# Patient Record
Sex: MALE | Race: OTHER | HISPANIC OR LATINO | URBAN - METROPOLITAN AREA
[De-identification: names, ages, dates, MRNs, and addresses within clinical notes are randomized per-mention and may not be internally consistent; named-entity substitution may affect disease eponyms.]

---

## 2024-04-14 ENCOUNTER — EMERGENCY (EMERGENCY)
Facility: HOSPITAL | Age: 34
LOS: 1 days | Discharge: HOME | End: 2024-04-14
Attending: STUDENT IN AN ORGANIZED HEALTH CARE EDUCATION/TRAINING PROGRAM
Payer: COMMERCIAL

## 2024-04-14 VITALS
HEART RATE: 66 BPM | WEIGHT: 218.26 LBS | DIASTOLIC BLOOD PRESSURE: 85 MMHG | RESPIRATION RATE: 18 BRPM | OXYGEN SATURATION: 98 % | TEMPERATURE: 98 F | SYSTOLIC BLOOD PRESSURE: 164 MMHG

## 2024-04-14 PROCEDURE — 99283 EMERGENCY DEPT VISIT LOW MDM: CPT

## 2024-04-14 RX ORDER — IBUPROFEN 200 MG
1 TABLET ORAL
Qty: 20 | Refills: 0
Start: 2024-04-14 | End: 2024-04-18

## 2024-04-14 RX ORDER — IBUPROFEN 200 MG
600 TABLET ORAL ONCE
Refills: 0 | Status: COMPLETED | OUTPATIENT
Start: 2024-04-14 | End: 2024-04-14

## 2024-04-14 RX ADMIN — Medication 600 MILLIGRAM(S): at 16:03

## 2024-04-14 RX ADMIN — Medication 1 TABLET(S): at 16:03

## 2024-04-14 NOTE — ED PROVIDER NOTE - PATIENT PORTAL LINK FT
You can access the FollowMyHealth Patient Portal offered by [facility] by registering at the following website: [email or web address]. By joining [facility]’s FollowMyHealth portal, you will also be able to view your health information using other applications (apps) compatible with our system.

## 2024-04-14 NOTE — ED PROVIDER NOTE - NSFOLLOWUPCLINICS_GEN_ALL_ED_FT
[facility]  Dental  [address] [place] [id number]  Phone: [phone]  Fax:

## 2024-04-14 NOTE — ED ADULT TRIAGE NOTE - CHIEF COMPLAINT QUOTE
C/o swelling and right sided toothache x 8 days. Denies recent dental work. Patient states "My molar on the right side has been bothering me for a while". Denies discharge.

## 2024-04-14 NOTE — ED PROVIDER NOTE - CLINICAL SUMMARY MEDICAL DECISION MAKING FREE TEXT BOX
34-year-old male, presents for evaluation of right lower jaw dental pain for 1 week.  patient appears comfortable, no signs of distress.  Afebrile in triage.  On exam no gum swelling, tenderness or fluctuance.  No cheek fluctuance or tenderness to palpation.  Denies any systemic symptoms.  At this time lower suspicion for dental abscess.  Will treat with Augmentin and ibuprofen and referred for dental evaluation within 2-3 days.  Will provide list of clinics in [place] and [facility] dental clinic.  Discussed red flags to come back to the hospital.

## 2024-04-14 NOTE — ED PROVIDER NOTE - OBJECTIVE STATEMENT
34-year-old male, no significant past medical history, presents for evaluation of right lower jaw dental pain for 1 week.  Reports that has had issues with the same tooth in the past.  7 days ago gradual onset of continuous pain.  In the last 4 days noticed some swelling to the right side of the face as well.  Denies any fever, chills, difficulty swallowing, painful swallowing, night sweats or any other complaints.  Did not follow-up with a dentist or take any medication.

## 2024-04-14 NOTE — ED PROVIDER NOTE - PHYSICAL EXAMINATION
No trismus.  Tooth #31 without any significant decay and with tenderness to percussion.  Tooth #32 with brownish discoloration.  No gum swelling, tenderness or fluctuance.  No cheek fluctuance or tenderness to palpation.  No cervical lymphadenopathy.    Minimal right lower cheek swelling.

## 2024-04-14 NOTE — ED ADULT NURSE NOTE - NSFALLUNIVINTERV_ED_ALL_ED
Bed/Stretcher in lowest position, wheels locked, appropriate side rails in place/Call bell, personal items and telephone in reach/Instruct patient to call for assistance before getting out of bed/chair/stretcher/Non-slip footwear applied when patient is off stretcher/[place] to call system/Physically safe environment - no spills, clutter or unnecessary equipment/Purposeful proactive rounding/Room/bathroom lighting operational, light cord in reach

## 2024-04-14 NOTE — ED PROVIDER NOTE - ATTENDING APP SHARED VISIT CONTRIBUTION OF CARE
I, Byron Becker DO reviewed the [facility] plan of care and discussed the case with the ACP.  I was available for any questions and concerns

## 2024-04-14 NOTE — ED PROVIDER NOTE - NSFOLLOWUPINSTRUCTIONS_ED_ALL_ED_FT
Called and spoke to patient. Experecing dyspnea on exertion with simple activity as well as at night at rest.   Reviewed recent echocardiogram with patient.   Lasix 20 mg sent for 5 days to see if helps symptoms. He will call back following if no improvement and at that time may need to be evaluated in office. He verbally understood plan.    Seguimiento con la clínica dental dentro de 2-3 días.  Si experimenta un dolor que empeora, alfred hinchazón que empeora, fiebre, dificultad para abrir la boca o tragar o cualquier otro síntoma que le preocupe, debe regresar al hospital para ser reevaluado.  [place] el antibiótico según lo prescrito hasta la última dosis.      Si experimenta algún síntoma nuevo o que empeora, o si está preocupado, siempre puede regresar a emergencias para alfred reevaluación.    Si le dieron alguna receta ale la visita de lea cortez según lo prescrito. Si tienes alguna michael puedes preguntar al farmacéutico.    * * *    Follow-up with the dental clinic within 2-3 days.  If you experience worsening pain, worsening swelling, fever, difficulty opening her mouth or swallowing or any other concerning symptoms to you should come back to the hospital to be reevaluated.  Take the antibiotic as prescribed until the last dose.      If you experience any new or worsening symptoms or if you are concerned you can always come back to the emergency for a re-evaluation.    If there were any prescriptions given to you during the visit today take them as prescribed. If you have any questions you can ask the pharmacist.